# Patient Record
Sex: FEMALE | Race: BLACK OR AFRICAN AMERICAN | NOT HISPANIC OR LATINO | Employment: FULL TIME | URBAN - METROPOLITAN AREA
[De-identification: names, ages, dates, MRNs, and addresses within clinical notes are randomized per-mention and may not be internally consistent; named-entity substitution may affect disease eponyms.]

---

## 2024-02-27 ENCOUNTER — OFFICE VISIT (OUTPATIENT)
Dept: FAMILY MEDICINE | Facility: CLINIC | Age: 39
End: 2024-02-27
Payer: COMMERCIAL

## 2024-02-27 VITALS
DIASTOLIC BLOOD PRESSURE: 93 MMHG | OXYGEN SATURATION: 98 % | SYSTOLIC BLOOD PRESSURE: 140 MMHG | HEART RATE: 83 BPM | BODY MASS INDEX: 32.63 KG/M2 | HEIGHT: 68 IN | WEIGHT: 215.31 LBS

## 2024-02-27 DIAGNOSIS — E66.09 CLASS 1 OBESITY DUE TO EXCESS CALORIES WITHOUT SERIOUS COMORBIDITY WITH BODY MASS INDEX (BMI) OF 32.0 TO 32.9 IN ADULT: ICD-10-CM

## 2024-02-27 DIAGNOSIS — Z00.00 WELLNESS EXAMINATION: Primary | ICD-10-CM

## 2024-02-27 DIAGNOSIS — M54.12 CERVICAL RADICULOPATHY: ICD-10-CM

## 2024-02-27 DIAGNOSIS — Z13.820 SCREENING FOR OSTEOPOROSIS: ICD-10-CM

## 2024-02-27 DIAGNOSIS — Z78.0 POSTMENOPAUSAL: ICD-10-CM

## 2024-02-27 PROCEDURE — 3077F SYST BP >= 140 MM HG: CPT | Mod: CPTII,,, | Performed by: FAMILY MEDICINE

## 2024-02-27 PROCEDURE — 1160F RVW MEDS BY RX/DR IN RCRD: CPT | Mod: CPTII,,, | Performed by: FAMILY MEDICINE

## 2024-02-27 PROCEDURE — 3008F BODY MASS INDEX DOCD: CPT | Mod: CPTII,,, | Performed by: FAMILY MEDICINE

## 2024-02-27 PROCEDURE — 1159F MED LIST DOCD IN RCRD: CPT | Mod: CPTII,,, | Performed by: FAMILY MEDICINE

## 2024-02-27 PROCEDURE — 99213 OFFICE O/P EST LOW 20 MIN: CPT | Mod: 25,,, | Performed by: FAMILY MEDICINE

## 2024-02-27 PROCEDURE — 3080F DIAST BP >= 90 MM HG: CPT | Mod: CPTII,,, | Performed by: FAMILY MEDICINE

## 2024-02-27 PROCEDURE — 99385 PREV VISIT NEW AGE 18-39: CPT | Mod: ,,, | Performed by: FAMILY MEDICINE

## 2024-02-27 RX ORDER — DICLOFENAC SODIUM 75 MG/1
75 TABLET, DELAYED RELEASE ORAL 2 TIMES DAILY PRN
Qty: 60 TABLET | Refills: 1 | Status: SHIPPED | OUTPATIENT
Start: 2024-02-27

## 2024-02-27 RX ORDER — CYCLOBENZAPRINE HCL 5 MG
5 TABLET ORAL NIGHTLY PRN
Qty: 30 TABLET | Refills: 1 | Status: SHIPPED | OUTPATIENT
Start: 2024-02-27 | End: 2024-06-16 | Stop reason: SDUPTHER

## 2024-02-27 NOTE — PROGRESS NOTES
Patient ID: [id number]     Chief Complaint: Establish Care        HPI:     Milly Gillespie is a 39 y.o. female here today for annual wellness labs and here to establish care.   The patient presents for well adult exam.    The patient's general health status is described as good.    The patient's diet is described as balanced.    Exercise: occasional.    Associated symptoms consist of denies weight loss, denies weight gain, denies fatigue, denies headache, denies hearing loss and denies vision changes.    Additional pertinent history: last dental exam: > 6 months (she has dental insurance, she will schedule an appointment next available) , last eye exam: 2023 (wears eyeglasses/contacts, goes every 1-2 years)  She is due for annual labs today. She would like HIV and Hep C screening.   LMP: s/p HYST in 2014 due to ectopic pregnancy, she needs DEXA scan ordered.   She is not interested in COVID-19 booster, she is UTD on all other vaccines.   She is obese, would like to see a dietician, she is not interested in weight loss surgery or medication. She denies snoring or excessive daytime sleepiness requiring a sleep study.  - Patient complains of neck pain, worse on the left, goes to back of her head and left shoulder x several years, pain is 10/10 on pain scale. 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 She hasn't been through PT, but she has seen a Chiropractor with some relief of pain. She reports that maternal GM has some sort of arthritis.   Patient is without any other complaints today.      Advance Care Planning     Date: 02/27/2024    Power of   I initiated the process of voluntary advance care planning today and explained the importance of this process to the patient.  I introduced the concept of advance directives to the patient, as well. Then the patient received  detailed information about the importance of designating a Health Care Power of  (HCPOA). She was also instructed to communicate with this person about their wishes for future healthcare, should she become sick and lose decision-making capacity. The patient has previously appointed a HCPOA. After our discussion, the patient has decided to complete a HCPOA and has appointed her significant other, health care agent:  Norman Hand  & health care agent number:  [phone]  I spent a total time of 5 minutes discussing this issue with the patient.             No past medical history on file.     Past Surgical History:   Procedure Laterality Date    HYSTERECTOMY         Review of patient's allergies indicates:  No Known Allergies    No outpatient medications have been marked as taking for the 2/27/24 encounter (Office Visit) with Ela Thomas MD.       Social History     Socioeconomic History    Marital status: Other   Tobacco Use    Smoking status: Former     Types: Cigarettes    Smokeless tobacco: Never   Substance and Sexual Activity    Alcohol use: Not Currently    Drug use: Never    Sexual activity: Yes        Family History   Problem Relation Age of Onset    Diabetes Mother     Hypertension Father         Subjective:       Review of Systems:    See HPI for details    Constitutional: Denies Change in appetite. Denies Chills. Denies Fever. Denies Night sweats.  Eye: Denies Blurred vision. Denies Discharge. Denies Eye pain.  ENT: Denies Decreased hearing. Denies Sore throat. Denies Swollen glands.  Respiratory: Denies Cough. Denies Shortness of breath. Denies Shortness of breath with exertion. Denies Wheezing.  Cardiovascular: Denies Chest pain at rest. Denies Chest pain with exertion. Denies Irregular heartbeat. Denies Palpitations.  Gastrointestinal: Denies Abdominal pain. Denies Diarrhea. Denies Nausea. Denies Vomiting. Denies Hematemesis or Hematochezia.  Genitourinary: Denies Dysuria. Denies  "Urinary frequency. Denies Urinary urgency. Denies Blood in urine.  Endocrine: Denies Cold intolerance. Denies Excessive thirst. Denies Heat intolerance. Denies Weight loss. Denies Weight gain.  Musculoskeletal: Reports Painful joints. Denies Weakness.  Integumentary: Denies Rash. Denies Itching. Denies Dry skin.  Neurologic: Denies Dizziness. Denies Fainting. Denies Headache.  Psychiatric: Denies Depression. Denies Anxiety. Denies Suicidal/Homicidal ideations.    All Other ROS: Negative except as stated in HPI.       Objective:     BP (!) 140/93 (BP Location: Right arm, Patient Position: Sitting, BP Method: Large (Automatic))   Pulse 83   Ht 5' 8" (1.727 m)   Wt 97.7 kg (215 lb 4.8 oz)   SpO2 98%   BMI 32.74 kg/m²     Physical Exam    General: Alert and oriented, No acute distress. Obese.   Head: Normocephalic, Atraumatic.  Eye: Pupils are equal, round and reactive to light, Extraocular movements are intact, Sclera non-icteric.  Ears/Nose/Throat: Normal, Mucosa moist,Clear.  Neck/Thyroid: Supple, Non-tender, No carotid bruit, No palpable thyromegaly or thyroid nodule, No lymphadenopathy, No JVD, Full range of motion.  Respiratory: Clear to auscultation bilaterally; No wheezes, rales or rhonchi,Non-labored respirations, Symmetrical chest wall expansion.  Cardiovascular: Regular rate and rhythm, S1/S2 normal, No murmurs, rubs or gallops.  Gastrointestinal: Soft, Non-tender, Non-distended, Normal bowel sounds, No palpable organomegaly.  Musculoskeletal: Normal range of motion. Neck with FROM, NT, no erythema, no effusion, no deformity.   Integumentary: Warm, Dry, Intact, No suspicious lesions or rashes.  Extremities: No clubbing, cyanosis or edema.  Neurologic: No focal deficits, Cranial Nerves II-XII are grossly intact, Motor strength normal upper and lower extremities, Sensory exam intact.  Psychiatric: Normal interaction, Coherent speech, Euthymic mood, Appropriate affect         Assessment:       ICD-10-CM " ICD-9-CM   1. Wellness examination  Z00.00 V70.0   2. Class 1 obesity due to excess calories without serious comorbidity with body mass index (BMI) of 32.0 to 32.9 in adult  E66.09 278.00    Z68.32 V85.32   3. Postmenopausal  Z78.0 V49.81   4. Screening for osteoporosis  Z13.820 V82.81   5. Cervical radiculopathy  M54.12 723.4        Plan:     Problem List Items Addressed This Visit    None  Visit Diagnoses       Wellness examination    -  Primary    Relevant Orders    CBC Auto Differential    Comprehensive Metabolic Panel    Hemoglobin A1C    Lipid Panel    TSH    Urinalysis, Reflex to Urine Culture    HIV 1/2 Ag/Ab (4th Gen)    Hepatitis C Antibody    Class 1 obesity due to excess calories without serious comorbidity with body mass index (BMI) of 32.0 to 32.9 in adult        Relevant Orders    Ambulatory referral/consult to Nutrition Services    Postmenopausal        Relevant Orders    DXA Bone Density Axial Skeleton 1 or more sites    Screening for osteoporosis        Relevant Orders    DXA Bone Density Axial Skeleton 1 or more sites    Cervical radiculopathy        Relevant Medications    diclofenac (VOLTAREN) 75 MG EC tablet    cyclobenzaprine (FLEXERIL) 5 MG tablet    Other Relevant Orders    X-Ray Cervical Spine AP And Lateral    ANTINUCLEAR ANTIBODIES COMPREHENSIVE PANEL    CYCLIC CITRULLINATED PEPTIDE (CCP) ANTIBODY    C-Reactive Protein         1. Wellness examination  - CBC Auto Differential; Future  - Comprehensive Metabolic Panel; Future  - Hemoglobin A1C; Future  - Lipid Panel; Future  - TSH; Future  - Urinalysis, Reflex to Urine Culture; Future  - HIV 1/2 Ag/Ab (4th Gen); Future  - Hepatitis C Antibody; Future  - Will treat pending lab results. Monthly breast self exam encouraged. Diet, exercise, and 10% weight loss encouraged. Keep appointment for dental exams x q6 months as scheduled. Keep appointment for annual eye exam as scheduled. Notify M.D. or ER if temp greater than 100.4, or any acute  illness.      2. Class 1 obesity due to excess calories without serious comorbidity with body mass index (BMI) of 32.0 to 32.9 in adult  - Ambulatory referral/consult to Nutrition Services; Future for dietary counseling  Body mass index is 32.74 kg/m².  Goal BMI <30.  Exercise 5 times a week for 30 minutes per day.  Avoid soda, simple sugars, excessive rice, potatoes or bread. Limit fast foods and fried foods.  Choose complex carbs in moderation (example: green vegetables, beans, oatmeal). Eat plenty of fresh fruits and vegetables with lean meats daily.  Do not skip meals. Eat a balanced portion size.  Avoid fad diets. Consider permanent healthy life style changes.      3. Postmenopausal  - DXA Bone Density Axial Skeleton 1 or more sites; Future    4. Screening for osteoporosis  - DXA Bone Density Axial Skeleton 1 or more sites; Future    5. Cervical radiculopathy  - X-Ray Cervical Spine AP And Lateral; Future  - ANTINUCLEAR ANTIBODIES COMPREHENSIVE PANEL; Future  - CYCLIC CITRULLINATED PEPTIDE (CCP) ANTIBODY; Future  - C-Reactive Protein; Future  - Rx trial of diclofenac (VOLTAREN) 75 MG EC tablet; Take 1 tablet (75 mg total) by mouth 2 (two) times daily as needed (pain/inflammation). Take with food.  Dispense: 60 tablet; Refill: 1  - Rx trial of cyclobenzaprine (FLEXERIL) 5 MG tablet; Take 1 tablet (5 mg total) by mouth nightly as needed for Muscle spasms.  Dispense: 30 tablet; Refill: 1  - If Xray is normal, will proceed with PT referral. Notify M.D. or ER if symptoms persist or worsen, temp >100.4, or any acute illness.          Milly was seen today for establish care.    Diagnoses and all orders for this visit:    Wellness examination  -     CBC Auto Differential; Future  -     Comprehensive Metabolic Panel; Future  -     Hemoglobin A1C; Future  -     Lipid Panel; Future  -     TSH; Future  -     Urinalysis, Reflex to Urine Culture; Future  -     HIV 1/2 Ag/Ab (4th Gen); Future  -     Hepatitis C Antibody;  Future    Class 1 obesity due to excess calories without serious comorbidity with body mass index (BMI) of 32.0 to 32.9 in adult  -     Ambulatory referral/consult to Nutrition Services; Future    Postmenopausal  -     DXA Bone Density Axial Skeleton 1 or more sites; Future    Screening for osteoporosis  -     DXA Bone Density Axial Skeleton 1 or more sites; Future    Cervical radiculopathy  -     X-Ray Cervical Spine AP And Lateral; Future  -     ANTINUCLEAR ANTIBODIES COMPREHENSIVE PANEL; Future  -     CYCLIC CITRULLINATED PEPTIDE (CCP) ANTIBODY; Future  -     C-Reactive Protein; Future  -     diclofenac (VOLTAREN) 75 MG EC tablet; Take 1 tablet (75 mg total) by mouth 2 (two) times daily as needed (pain/inflammation). Take with food.  -     cyclobenzaprine (FLEXERIL) 5 MG tablet; Take 1 tablet (5 mg total) by mouth nightly as needed for Muscle spasms.                 Follow up in about 1 year (around 2/27/2025) for Wellness.

## 2024-03-04 ENCOUNTER — HOSPITAL ENCOUNTER (OUTPATIENT)
Dept: RADIOLOGY | Facility: HOSPITAL | Age: 39
Discharge: HOME | End: 2024-03-04
Attending: FAMILY MEDICINE
Payer: COMMERCIAL

## 2024-03-04 DIAGNOSIS — Z78.0 POSTMENOPAUSAL: ICD-10-CM

## 2024-03-04 DIAGNOSIS — R82.71 BACTERIA IN URINE: ICD-10-CM

## 2024-03-04 DIAGNOSIS — Z13.820 SCREENING FOR OSTEOPOROSIS: ICD-10-CM

## 2024-03-04 DIAGNOSIS — R82.998 URINE WBC INCREASED: Primary | ICD-10-CM

## 2024-03-04 DIAGNOSIS — R31.1 BENIGN MICROSCOPIC HEMATURIA: ICD-10-CM

## 2024-03-04 PROCEDURE — 77080 DXA BONE DENSITY AXIAL: CPT | Mod: 26,XU,, | Performed by: RADIOLOGY

## 2024-03-04 PROCEDURE — 77081 DXA BONE DENSITY APPENDICULR: CPT | Mod: 26,,, | Performed by: RADIOLOGY

## 2024-03-04 PROCEDURE — 77081 DXA BONE DENSITY APPENDICULR: CPT | Mod: TC

## 2024-03-04 PROCEDURE — 77080 DXA BONE DENSITY AXIAL: CPT | Mod: XU,TC

## 2024-03-05 ENCOUNTER — TELEPHONE (OUTPATIENT)
Dept: FAMILY MEDICINE | Facility: CLINIC | Age: 39
End: 2024-03-05
Payer: COMMERCIAL

## 2024-03-05 DIAGNOSIS — M85.832 OSTEOPENIA OF LEFT FOREARM: Primary | ICD-10-CM

## 2024-03-05 NOTE — TELEPHONE ENCOUNTER
----- Message from Nani Sam sent at 3/5/2024 10:59 AM CST -----  Regarding: callback  .Type:  Patient Returning Call    Who Called:PT    Who Left Message for Patient:not sure    Does the patient know what this is regarding?:not sure    Would the patient rather a call back or a response via [facility]? CHRISTIANE    Best Call Back Number:[phone]    Additional Information: Pt is returning a call to the office; please advise. Thanks.

## 2024-03-05 NOTE — TELEPHONE ENCOUNTER
----- Message from Ela Thomas MD sent at 3/5/2024  3:44 PM CST -----  Testing for auto-immune disease panel is negative for auto-immune disease and rheumatoid arthritis.

## 2024-03-05 NOTE — TELEPHONE ENCOUNTER
----- Message from Ela Thomas MD sent at 3/5/2024  9:26 AM CST -----  DEXA scan shows osteopenia (thinning of her bones) of left forearm and increases her risk of a forearm fracture, no osteoporosis, she will need to take an OTC Calcium plus Vit D supplement as directed, like Os-cayla D or Caltrate D, to help strengthen her bones. Will need to repeat DEXA scan in 03/2026.

## 2024-03-06 ENCOUNTER — TELEPHONE (OUTPATIENT)
Dept: FAMILY MEDICINE | Facility: CLINIC | Age: 39
End: 2024-03-06
Payer: COMMERCIAL

## 2024-03-06 ENCOUNTER — PATIENT MESSAGE (OUTPATIENT)
Dept: FAMILY MEDICINE | Facility: CLINIC | Age: 39
End: 2024-03-06
Payer: COMMERCIAL

## 2024-03-06 NOTE — TELEPHONE ENCOUNTER
----- Message from Ela Thomas MD sent at 3/4/2024  8:06 PM CST -----  She has white blood cells, microscopic blood, bacteria and squamous cells in her urine, could be due to contaminated specimen or bacterial urinary tract infection, my office staff will contact the lab to add. HIV and Hepatitis C testing is negative. Auto-immune disease panel is pending. Remaining labs are essentially normal.

## 2024-03-06 NOTE — TELEPHONE ENCOUNTER
Detailed message sent to patient through portal to contact office regarding lab results after leaving several voicemail to call.

## 2024-03-19 ENCOUNTER — LAB VISIT (OUTPATIENT)
Dept: LAB | Facility: HOSPITAL | Age: 39
End: 2024-03-19
Attending: FAMILY MEDICINE
Payer: COMMERCIAL

## 2024-03-19 DIAGNOSIS — R31.1 BENIGN MICROSCOPIC HEMATURIA: ICD-10-CM

## 2024-03-19 DIAGNOSIS — R82.71 BACTERIA IN URINE: ICD-10-CM

## 2024-03-19 DIAGNOSIS — R82.998 URINE WBC INCREASED: ICD-10-CM

## 2024-03-19 PROCEDURE — 87086 URINE CULTURE/COLONY COUNT: CPT

## 2024-03-21 ENCOUNTER — PATIENT MESSAGE (OUTPATIENT)
Dept: FAMILY MEDICINE | Facility: CLINIC | Age: 39
End: 2024-03-21
Payer: COMMERCIAL

## 2024-03-21 LAB — BACTERIA UR CULT: NORMAL

## 2024-06-11 ENCOUNTER — PATIENT MESSAGE (OUTPATIENT)
Facility: CLINIC | Age: 39
End: 2024-06-11
Payer: COMMERCIAL

## 2024-06-16 DIAGNOSIS — M54.12 CERVICAL RADICULOPATHY: ICD-10-CM

## 2024-06-17 RX ORDER — CYCLOBENZAPRINE HCL 5 MG
5 TABLET ORAL NIGHTLY PRN
Qty: 30 TABLET | Refills: 1 | Status: SHIPPED | OUTPATIENT
Start: 2024-06-17

## 2024-06-24 ENCOUNTER — E-VISIT (OUTPATIENT)
Dept: FAMILY MEDICINE | Facility: CLINIC | Age: 39
End: 2024-06-24
Payer: COMMERCIAL

## 2024-06-24 DIAGNOSIS — R10.30 LOWER ABDOMINAL PAIN: ICD-10-CM

## 2024-06-24 DIAGNOSIS — N23 RENAL COLIC: Primary | ICD-10-CM

## 2024-06-24 RX ORDER — KETOROLAC TROMETHAMINE 10 MG/1
10 TABLET, FILM COATED ORAL EVERY 6 HOURS
Qty: 20 TABLET | Refills: 0 | Status: SHIPPED | OUTPATIENT
Start: 2024-06-24 | End: 2024-06-29

## 2024-06-24 NOTE — PROGRESS NOTES
Patient ID: Milly Gillespie is a 39 y.o. female.    Chief Complaint: GI Problem (Entered automatically based on patient selection in Patient Portal.)    The patient initiated a request through [employer] on 6/24/2024 for evaluation and management with a chief complaint of GI Problem (Entered automatically based on patient selection in Patient Portal.)     I evaluated the questionnaire submission on 06/24/2024.    Ohs Peq Evisit Diarrhea    6/24/2024  9:38 AM CDT - Filed by Patient   Do you agree to participate in an E-Visit? Yes   If you have any of the following symptoms, please present to your local emergency room or call 911:  I acknowledge   Are you pregnant, could you be pregnant, or are you breast feeding? None of the above   What is the main issue you would like addressed today? Pain and pressure in groin and lower abdomen, also passed a stone while peeing   Do you have diarrhea? No   Are you vomiting? No, I am not vomiting   Do you have belly pain? I have pain in the lower part of my belly   Are you feeling dizzy or like you might pass out? Yes   When did your symptoms begin? 6/10/2024   Do you have a fever? No, I do not have a fever   Are you having trouble walking or lifting yourself due to weakness from this illness?  No   Do any of the following apply to you? My urine is normal   Did your condition begin after a specific meal that may have caused the illness? Not clearly related to a meal.    Have you taken antibiotics recently? I have not been on any antibiotics   Have you been hospitalized in the past 2 months? No, I have not been hospitalized recently.   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 No   Have you had a meal consisting of raw meat or fish in the week prior to your illness? No   Have you recently travelled to a place where you may have caught an illness? No   Have you tried any medication or other treatment for your  symptoms? No   Provide any additional information you feel is important. Pain is consistent thought maybe it was gas, but it doesn't feel comfortable, and feels like somethings there.   Please attach any relevant images or files    Are you able to take your vital signs? No         Encounter Diagnoses   Name Primary?    Renal colic Yes    Lower abdominal pain         Orders Placed This Encounter   Procedures    Urine Culture High Risk     Standing Status:   Future     Standing Expiration Date:   9/22/2025    CT Abdomen Pelvis  Without Contrast     Standing Status:   Future     Standing Expiration Date:   6/24/2025     Order Specific Question:   Oral/Rectal Contrast instructions:     Answer:   NO Oral Contrast     Order Specific Question:   Special CT ABD Protocol Request?     Answer:   Routine     Order Specific Question:   May the Radiologist modify the order per protocol to meet the clinical needs of the patient?     Answer:   Yes    Comprehensive Metabolic Panel     Standing Status:   Future     Standing Expiration Date:   8/23/2025    CBC Auto Differential     Standing Status:   Future     Standing Expiration Date:   8/23/2025    Urinalysis     Standing Status:   Future     Standing Expiration Date:   8/23/2025     Order Specific Question:   Collection Type     Answer:   Urine, Clean Catch    Uric Acid     Standing Status:   Future     Standing Expiration Date:   9/22/2025      Medications Ordered This Encounter   Medications    ketorolac (TORADOL) 10 mg tablet     Sig: Take 1 tablet (10 mg total) by mouth every 6 (six) hours. Take with food. for 5 days     Dispense:  20 tablet     Refill:  0     I ordered a urinalysis and urine culture to evaluate for possible urinary tract infection, and I also ordered a CT abdomen/pelvis, CMP, CBC, and uric acid level due to patient with signs of passing a kidney stone, once CT is scheduled she can perform the blood work at the same facility as the CT. I also sent a  prescription for Toradol pills to take as directed to help ease her pain. She will need to strain her urine for the next few days and bring any particles into our office for stone analysis. Increase fluid intake. If symptoms are refractory to treatment, will proceed with Urology referral. Notify M.D. or go to nearest ER if symptoms persist or worsen, abdominal pain, bloody urine, vomiting, vaginal discharge, temp >100.4, or any acute illness.         Follow up if symptoms worsen or fail to improve.      E-Visit Time Tracking:    Day 1 Time (in minutes): 9    Total Time (in minutes): 9

## 2024-11-05 ENCOUNTER — HOSPITAL ENCOUNTER (OUTPATIENT)
Dept: RADIOLOGY | Facility: HOSPITAL | Age: 39
Discharge: HOME | End: 2024-11-05
Attending: FAMILY MEDICINE
Payer: COMMERCIAL

## 2024-11-05 DIAGNOSIS — M54.12 CERVICAL RADICULOPATHY: ICD-10-CM

## 2024-11-05 PROCEDURE — 72040 X-RAY EXAM NECK SPINE 2-3 VW: CPT | Mod: TC

## 2024-11-06 ENCOUNTER — PATIENT MESSAGE (OUTPATIENT)
Dept: FAMILY MEDICINE | Facility: CLINIC | Age: 39
End: 2024-11-06
Payer: COMMERCIAL

## 2024-11-06 DIAGNOSIS — M54.12 CERVICAL RADICULOPATHY: Primary | ICD-10-CM

## 2025-02-24 DIAGNOSIS — Z00.00 WELLNESS EXAMINATION: Primary | ICD-10-CM

## 2025-04-05 ENCOUNTER — HOSPITAL ENCOUNTER (EMERGENCY)
Facility: HOSPITAL | Age: 40
Discharge: HOME | End: 2025-04-05
Attending: EMERGENCY MEDICINE
Payer: COMMERCIAL

## 2025-04-05 VITALS
HEART RATE: 77 BPM | TEMPERATURE: 98 F | SYSTOLIC BLOOD PRESSURE: 149 MMHG | DIASTOLIC BLOOD PRESSURE: 98 MMHG | BODY MASS INDEX: 34.86 KG/M2 | OXYGEN SATURATION: 99 % | WEIGHT: 230 LBS | HEIGHT: 68 IN | RESPIRATION RATE: 19 BRPM

## 2025-04-05 DIAGNOSIS — S39.012A BACK STRAIN, INITIAL ENCOUNTER: Primary | ICD-10-CM

## 2025-04-05 LAB
B-HCG UR QL: NEGATIVE
CTP QC/QA: YES

## 2025-04-05 PROCEDURE — 96372 THER/PROPH/DIAG INJ SC/IM: CPT | Performed by: PHYSICIAN ASSISTANT

## 2025-04-05 PROCEDURE — 99284 EMERGENCY DEPT VISIT MOD MDM: CPT | Mod: 25

## 2025-04-05 PROCEDURE — 63600175 PHARM REV CODE 636 W HCPCS: Mod: JZ,TB | Performed by: PHYSICIAN ASSISTANT

## 2025-04-05 RX ORDER — KETOROLAC TROMETHAMINE 10 MG/1
10 TABLET, FILM COATED ORAL EVERY 6 HOURS
Qty: 20 TABLET | Refills: 0 | Status: SHIPPED | OUTPATIENT
Start: 2025-04-05 | End: 2025-04-10

## 2025-04-05 RX ORDER — KETOROLAC TROMETHAMINE 30 MG/ML
60 INJECTION, SOLUTION INTRAMUSCULAR; INTRAVENOUS ONCE
Status: COMPLETED | OUTPATIENT
Start: 2025-04-05 | End: 2025-04-05

## 2025-04-05 RX ADMIN — KETOROLAC TROMETHAMINE 60 MG: 60 INJECTION, SOLUTION INTRAMUSCULAR at 12:04

## 2025-04-05 NOTE — ED PROVIDER NOTES
Encounter Date: 4/5/2025       History     Chief Complaint   Patient presents with    Back Pain     Patient reports bending down to  her son this morning and then felt a pain in her middle back. Pain is worse with position changes. Denies taking any OTC medications. Ambulatory into triage.      40 y.o. female presents to the ED with mid back pain onset this morning after bending down to help her son.  States it felt like a pressure in her mid back with sitting.  Notes the pain gets worse when changing positions.  Did not take any medications at home.  Denies numbness, tingling, weakness. States she recently started working out again last week and did some movements that required her to bend down.    The history is provided by the patient. No  was used.     Review of patient's allergies indicates:  No Known Allergies  No past medical history on file.  Past Surgical History:   Procedure Laterality Date    HYSTERECTOMY       Family History   Problem Relation Name Age of Onset    Diabetes Mother      Hypertension Father       Social History[1]  Review of Systems   Constitutional:  Negative for chills and fever.   Eyes:  Negative for visual disturbance.   Respiratory:  Negative for cough and shortness of breath.    Cardiovascular:  Negative for chest pain.   Gastrointestinal:  Negative for abdominal pain, nausea and vomiting.   Genitourinary:  Negative for dysuria.   Musculoskeletal:  Positive for back pain. Negative for arthralgias and gait problem.   Skin:  Negative for color change and rash.   Neurological:  Negative for dizziness and headaches.   Psychiatric/Behavioral:  Negative for behavioral problems.    All other systems reviewed and are negative.      Physical Exam     Initial Vitals [04/05/25 1143]   BP Pulse Resp Temp SpO2   (!) 181/94 (!) 116 20 97.9 °F (36.6 °C) 99 %      MAP       --         Physical Exam    Nursing note and vitals reviewed.  Constitutional: She appears  well-developed and well-nourished.   HENT:   Head: Normocephalic and atraumatic.   Eyes: EOM are normal. Pupils are equal, round, and reactive to light.   Neck: Neck supple.   Cardiovascular:  Normal rate, regular rhythm and normal heart sounds.           Pulmonary/Chest: Breath sounds normal.   Abdominal: Abdomen is soft. Bowel sounds are normal.   Musculoskeletal:         General: Normal range of motion.      Cervical back: Normal and neck supple.      Thoracic back: Tenderness present. No spasms or bony tenderness. Normal range of motion.      Lumbar back: Normal. No tenderness or bony tenderness. Normal range of motion. Negative right straight leg raise test and negative left straight leg raise test.     Neurological: She is alert and oriented to person, place, and time. She has normal strength. GCS score is 15. GCS eye subscore is 4. GCS verbal subscore is 5. GCS motor subscore is 6.   Skin: Skin is warm and dry.   Psychiatric: She has a normal mood and affect.         ED Course   Procedures  Labs Reviewed - No data to display       Imaging Results              X-Ray Thoracic Spine AP Lateral (Final result)  Result time 04/05/25 12:12:58      Final result by Reed Chopra MD (04/05/25 12:12:58)                   Narrative:    EXAMINATION  XR THORACIC SPINE AP LATERAL    CLINICAL HISTORY  mid back pain;    TECHNIQUE  A total of 3 images submitted of the thoracic spine.    COMPARISON  None available at the time of initial interpretation.    FINDINGS  Vertebral segments: No acute cortical displacement, compression deformity, or traumatic listhesis. Intervertebral spaces are unremarkable.    Curvature: No grossly abnormal curvature is identified.    Soft tissues: No acute or focal abnormality.    IMPRESSION  No convincing acute abnormality.      Electronically signed by: Reed Chopra  Date:    04/05/2025  Time:    12:12                                     Medications   ketorolac injection 60 mg (60 mg  "Intramuscular Given 4/5/25 1243)     Medical Decision Making  Differential diagnosis: sprain vs strain, compression fracture, soreness, spasm     40 y.o. female presents to the ED with mid back pain onset this morning after bending down to help her son.  States it felt like a pressure in her mid back with sitting.  Notes the pain gets worse when changing positions.  Did not take any medications at home.  Denies numbness, tingling, weakness. States she recently started working out again last week and did some movements that required her to bend down.      Amount and/or Complexity of Data Reviewed  Radiology: ordered.    Risk  Prescription drug management.    BP (!) 149/98   Pulse 77   Temp 97.9 °F (36.6 °C) (Oral)   Resp 19   Ht 5' 8" (1.727 m)   Wt 104.3 kg (230 lb)   SpO2 99%   BMI 34.97 kg/m²              ED Course as of 04/05/25 1741   Sat Apr 05, 2025   1740 Notes relief of pain with Toradol.  We will discharge home with muscle relaxer as well as anti-inflammatory.  Did give stretches to do at home.  Encouraged use of a heating pad. [MJ]      ED Course User Index  [MJ] Inocente Page PA-C                   Clinical Impression:  Final diagnoses:  [S39.012A] Back strain, initial encounter (Primary)          ED Disposition Condition    Discharge Stable          ED Prescriptions       Medication Sig Dispense Start Date End Date Auth. Provider    ketorolac (TORADOL) 10 mg tablet Take 1 tablet (10 mg total) by mouth every 6 (six) hours. for 5 days 20 tablet 4/5/2025 4/10/2025 Inocente Page PA-C          Follow-up Information       Follow up With Specialties Details Why Contact Info    Ela Thomas MD Family Medicine   [address]  [address]  [facility] [id number] [phone]                   [1]   Social History  Tobacco Use    Smoking status: Former     Types: Cigarettes    Smokeless tobacco: Never   Substance Use Topics    Alcohol use: Not Currently    Drug use: Never      "   Inocente Page PA-C  04/05/25 5295